# Patient Record
Sex: FEMALE | Race: OTHER | URBAN - METROPOLITAN AREA
[De-identification: names, ages, dates, MRNs, and addresses within clinical notes are randomized per-mention and may not be internally consistent; named-entity substitution may affect disease eponyms.]

---

## 2024-04-24 ENCOUNTER — OFFICE VISIT (OUTPATIENT)
Dept: FAMILY MEDICINE CLINIC | Facility: CLINIC | Age: 54
End: 2024-04-24
Payer: COMMERCIAL

## 2024-04-24 VITALS
HEART RATE: 70 BPM | DIASTOLIC BLOOD PRESSURE: 62 MMHG | WEIGHT: 137 LBS | TEMPERATURE: 97 F | BODY MASS INDEX: 24.27 KG/M2 | SYSTOLIC BLOOD PRESSURE: 102 MMHG | HEIGHT: 63 IN | RESPIRATION RATE: 16 BRPM

## 2024-04-24 DIAGNOSIS — Z91.018 HISTORY OF FOOD ALLERGY: Primary | ICD-10-CM

## 2024-04-24 DIAGNOSIS — Z00.00 HEALTHCARE MAINTENANCE: ICD-10-CM

## 2024-04-24 DIAGNOSIS — Z12.31 ENCOUNTER FOR SCREENING MAMMOGRAM FOR MALIGNANT NEOPLASM OF BREAST: ICD-10-CM

## 2024-04-24 DIAGNOSIS — Z12.11 SCREEN FOR COLON CANCER: ICD-10-CM

## 2024-04-24 PROBLEM — R03.0 ELEVATED BLOOD PRESSURE, SITUATIONAL: Status: ACTIVE | Noted: 2023-06-26

## 2024-04-24 PROCEDURE — 99386 PREV VISIT NEW AGE 40-64: CPT | Performed by: FAMILY MEDICINE

## 2024-04-24 RX ORDER — PHENOL 1.4 %
600 AEROSOL, SPRAY (ML) MUCOUS MEMBRANE DAILY
COMMUNITY

## 2024-04-24 RX ORDER — MAGNESIUM OXIDE 400 MG (241.3 MG MAGNESIUM) TABLET
325 TABLET
COMMUNITY

## 2024-04-24 RX ORDER — CETIRIZINE HYDROCHLORIDE 10 MG/1
10 TABLET ORAL DAILY
COMMUNITY

## 2024-04-25 NOTE — PROGRESS NOTES
[facility] Family Medicine Office Note  Chief Complaint:   Chief Complaint   Patient presents with    Establish Care    Blood Pressure       HPI:   This is a 54 year old female coming in for establishing care.  The patient states that she has multiple issues with allergies.  She states that she has had episodes with almonds as well as other foods that she has been unable to pinpoint where she has episodes of trouble swallowing.  She has had epiglottitis to the point of needing hospitalization.  She currently has an EpiPen as well as prednisone and allergy medications with her at all times.  States that she would like to be evaluated for this so that she knows what she needs to stay away from.  She states that she has had some episodes of elevated blood pressure in the past and had been on medications for this but at this point she has not had the need for it.  She denies any family history of breast cancer or colon cancer.  States that she has not had a mammogram or Pap smear in some time.  She does have a smoking history she quit about 15 years ago denies any illicit drug use does not use alcohol.  Denies any chest pain nausea vomiting diarrhea constipation.      No past medical history on file.  Past Surgical History:   Procedure Laterality Date    Path report - adenoids excision (d2v.1)      Us breast cyst aspiration 1 site right (cpt=76942/37418) Right      Social History:  Social History     Socioeconomic History    Marital status: Single   Tobacco Use    Smoking status: Former     Types: Cigarettes    Smokeless tobacco: Never   Vaping Use    Vaping status: Never Used   Substance and Sexual Activity    Alcohol use: Yes     Comment: socially    Drug use: Never    Sexual activity: Yes     Partners: Male   Other Topics Concern    Caffeine Concern No    Exercise No    Seat Belt Yes     Social Determinants of Health     Financial Resource Strain: Not on File (6/26/2023)    Received from [employer]  Resource Strain     Financial Resource Strain: 0   Food Insecurity: Not on File (6/26/2023)    Received from [employer]     Food Insecurity     Food: 0   Transportation Needs: Not on File (6/26/2023)    Received from [employer]     Transportation Needs     Transportation: 0   Physical Activity: Not on File (6/26/2023)    Received from [employer]     Physical Activity     Physical Activity: 0   Stress: Not on File (6/26/2023)    Received from [employer]     Stress     Stress: 0   Social Connections: Not on File (6/26/2023)    Received from [employer]     Social Connections     Social Connections and Isolation: 0    [facility]     Family History:  No family history on file.  Allergies:  Not on File  Current Meds:  Current Outpatient Medications   Medication Sig Dispense Refill    Multiple Vitamins-Minerals (CENTRUM SILVER 50+WOMEN OR) Take 1 tablet by mouth daily.      Calcium Carbonate 600 MG Oral Tab Take 1 tablet (600 mg total) by mouth daily.      Cyanocobalamin (VITAMIN B-12 CR OR) Take 1 capsule by mouth daily.      ferrous sulfate 325 (65 FE) MG Oral Tab EC Take 1 tablet (325 mg total) by mouth daily with breakfast.      cetirizine 10 MG Oral Tab Take 1 tablet (10 mg total) by mouth daily.        Counseling given: Not Answered       REVIEW OF SYSTEMS:   Consitutional: No fevers, chills, sweats  Eye: No recent visual problems  ENMT: No ear pain nasal congestion sore throat  Respiratory: No shortness of breath, cough  Cardiovascular: No chest pain palpitations syncope  Gastrointestinal: No nausea vomiting diarrhea  Genitourinary: No hematuria  Hema/Lymph no bruising tendency, swollen lymph glands  Endocrine: Negative for excessive thirst excessive hunger  Musculoskeletal: No back pain neck pain joint pain muscle pain decreased range of motion  Integumentary: No rash, pruritus, abrasions  Neurologic: Alert and oriented x4  Psychiatric: No anxiety, depression    Medical, surgical, family, and social histories  were reviewed      EXAM:   VITALS:   Vitals:    04/24/24 1624   BP: 102/62   Pulse: 70   Resp: 16   Temp: 97.2 °F (36.2 °C)      GENERAL: well developed, well nourished, in no apparent distress  SKIN: no rashes, no suspicious lesions: Cool and Dry  HEENT: atraumatic, normocephalic, ears and throat are clear    Ears: TM's clear and visible bilaterally, no excess cerumen or erythema.   EYES: Pupils equal round and reactive.  Extraocular motions intact no scleral icterus no injection or drainage  THROAT without erythema tonsillar hypertrophy or exudate.  Uvula midline airway patent  NECK: Given midline.  No JVD or lymphadenopathy supple nontender no meningeal signs   LUNGS: clear to auscultation sounds equal bilaterally no wheezes rales or rhonchi  CARDIO: Regular rate and rhythm without murmurs gallops or rubs  GI: Soft nontender nondistended no hepatomegaly palpable masses.  No guarding.   without deformity or crepitus no flank tenderness  EXTREMITIES: no cyanosis, clubbing or edema no joint tenderness effusion or edema noted.  No calf tenderness negative Homans' sign bilaterally  NEURO: Awake and alert.  Cranial nerves II through XII intact motor and sensory grossly within normal limits.  5 out of 5 muscle strength in all muscle groups.  Normal speech.  PSYCHIATRIC: Awake, alert and oriented x3, cooperative appropriate mood and affect.  Judgment intact       ASSESSMENT AND PLAN:   1. Healthcare maintenance  - Comp Metabolic Panel (14); Future  - Lipid Panel; Future  - Triiodothyronine (T3) Total; Future  - TSH and Free T4; Future  - CBC With Differential With Platelet; Future  I did discuss with the patient we would need to update labs we need a CBC CMP free T4 free T3 TSH.  Did also discuss we will go ahead and refer her to an allergist.  I did discuss that hopefully they will be able to discern what the allergies that she is having.  I did discuss with her to continue with the antihistamines as well as  to carry the EpiPen for any incidents.  Will also be referring her to gastroenterology for screening colonoscopy as well as placing a referral for mammogram.  Will be holding off on her other immunizations until she follows up with an allergist.  Will also be likely referring her to OB/GYN for her Pap smear.  2. History of food allergy  - Allergy Referral - In Network    3. Encounter for screening mammogram for malignant neoplasm of breast  - [facility] LALITO 2D+3D SCREENING BILAT (CPT=[id number]); Future    4. Screen for colon cancer  - Gastro Referral - In Network       Meds & Refills for this Visit:  Requested Prescriptions      No prescriptions requested or ordered in this encounter       Health Maintenance:  Health Maintenance Due   Topic Date Due    Colorectal Cancer Screening  Never done    Annual Physical  Never done    Mammogram  Never done    DTaP,Tdap,and Td Vaccines (1 - Tdap) Never done    Pap Smear  Never done    Zoster Vaccines (1 of 2) Never done    COVID-19 Vaccine (2 - 2023-24 season) 09/01/2023    Annual Depression Screening  Never done       Patient/Caregiver Education: Patient/Caregiver Education: There are no barriers to learning. Medical education done.   Outcome: Patient verbalizes understanding. Patient is notified to call with any questions, complications, allergies, or worsening or changing symptoms.  Patient is to call with any side effects or complications from the treatments as a result of today.     Problem List:  Patient Active Problem List   Diagnosis    Elevated blood pressure, situational         No follow-ups on file.     Sameer Joiner MD    Please note that portions of this note may have been completed with a voice recognition program. Efforts were made to edit the dictations but occasionally words are mis-transcribed.

## 2024-05-11 ENCOUNTER — LAB ENCOUNTER (OUTPATIENT)
Dept: LAB | Age: 54
End: 2024-05-11
Attending: FAMILY MEDICINE
Payer: COMMERCIAL

## 2024-05-11 DIAGNOSIS — Z00.00 HEALTHCARE MAINTENANCE: ICD-10-CM

## 2024-05-11 LAB
ALBUMIN SERPL-MCNC: 4.3 G/DL (ref 3.2–4.8)
ALBUMIN/GLOB SERPL: 1.4 {RATIO} (ref 1–2)
ALP LIVER SERPL-CCNC: 68 U/L
ALT SERPL-CCNC: 38 U/L
ANION GAP SERPL CALC-SCNC: 3 MMOL/L (ref 0–18)
AST SERPL-CCNC: 29 U/L (ref ?–34)
BASOPHILS # BLD AUTO: 0.02 X10(3) UL (ref 0–0.2)
BASOPHILS NFR BLD AUTO: 0.5 %
BILIRUB SERPL-MCNC: 0.3 MG/DL (ref 0.3–1.2)
BUN BLD-MCNC: 19 MG/DL (ref 9–23)
BUN/CREAT SERPL: 26.4 (ref 10–20)
CALCIUM BLD-MCNC: 8.6 MG/DL (ref 8.7–10.4)
CHLORIDE SERPL-SCNC: 109 MMOL/L (ref 98–112)
CHOLEST SERPL-MCNC: 182 MG/DL (ref ?–200)
CO2 SERPL-SCNC: 28 MMOL/L (ref 21–32)
CREAT BLD-MCNC: 0.72 MG/DL
DEPRECATED RDW RBC AUTO: 47.9 FL (ref 35.1–46.3)
EGFRCR SERPLBLD CKD-EPI 2021: 99 ML/MIN/1.73M2 (ref 60–?)
EOSINOPHIL # BLD AUTO: 0.07 X10(3) UL (ref 0–0.7)
EOSINOPHIL NFR BLD AUTO: 1.8 %
ERYTHROCYTE [DISTWIDTH] IN BLOOD BY AUTOMATED COUNT: 14.3 % (ref 11–15)
FASTING PATIENT LIPID ANSWER: YES
FASTING STATUS PATIENT QL REPORTED: YES
GLOBULIN PLAS-MCNC: 3 G/DL (ref 2–3.5)
GLUCOSE BLD-MCNC: 87 MG/DL (ref 70–99)
HCT VFR BLD AUTO: 37.4 %
HDLC SERPL-MCNC: 83 MG/DL (ref 40–59)
HGB BLD-MCNC: 12.8 G/DL
IMM GRANULOCYTES # BLD AUTO: 0.01 X10(3) UL (ref 0–1)
IMM GRANULOCYTES NFR BLD: 0.3 %
LDLC SERPL CALC-MCNC: 90 MG/DL (ref ?–100)
LYMPHOCYTES # BLD AUTO: 1.24 X10(3) UL (ref 1–4)
LYMPHOCYTES NFR BLD AUTO: 31 %
MCH RBC QN AUTO: 31.1 PG (ref 26–34)
MCHC RBC AUTO-ENTMCNC: 34.2 G/DL (ref 31–37)
MCV RBC AUTO: 90.8 FL
MONOCYTES # BLD AUTO: 0.33 X10(3) UL (ref 0.1–1)
MONOCYTES NFR BLD AUTO: 8.3 %
NEUTROPHILS # BLD AUTO: 2.33 X10 (3) UL (ref 1.5–7.7)
NEUTROPHILS # BLD AUTO: 2.33 X10(3) UL (ref 1.5–7.7)
NEUTROPHILS NFR BLD AUTO: 58.1 %
NONHDLC SERPL-MCNC: 99 MG/DL (ref ?–130)
OSMOLALITY SERPL CALC.SUM OF ELEC: 292 MOSM/KG (ref 275–295)
PLATELET # BLD AUTO: 248 10(3)UL (ref 150–450)
POTASSIUM SERPL-SCNC: 4 MMOL/L (ref 3.5–5.1)
PROT SERPL-MCNC: 7.3 G/DL (ref 5.7–8.2)
RBC # BLD AUTO: 4.12 X10(6)UL
SODIUM SERPL-SCNC: 140 MMOL/L (ref 136–145)
T3 SERPL-MCNC: 0.88 NG/ML (ref 0.6–1.81)
T4 FREE SERPL-MCNC: 1 NG/DL (ref 0.8–1.7)
TRIGL SERPL-MCNC: 45 MG/DL (ref 30–149)
TSI SER-ACNC: 1.38 MIU/ML (ref 0.55–4.78)
VLDLC SERPL CALC-MCNC: 7 MG/DL (ref 0–30)
WBC # BLD AUTO: 4 X10(3) UL (ref 4–11)

## 2024-05-11 PROCEDURE — 84480 ASSAY TRIIODOTHYRONINE (T3): CPT

## 2024-05-11 PROCEDURE — 84439 ASSAY OF FREE THYROXINE: CPT

## 2024-05-11 PROCEDURE — 80061 LIPID PANEL: CPT

## 2024-05-11 PROCEDURE — 84443 ASSAY THYROID STIM HORMONE: CPT

## 2024-05-11 PROCEDURE — 85025 COMPLETE CBC W/AUTO DIFF WBC: CPT

## 2024-05-11 PROCEDURE — 36415 COLL VENOUS BLD VENIPUNCTURE: CPT

## 2024-05-11 PROCEDURE — 80053 COMPREHEN METABOLIC PANEL: CPT

## 2024-05-22 ENCOUNTER — TELEPHONE (OUTPATIENT)
Dept: FAMILY MEDICINE CLINIC | Facility: CLINIC | Age: 54
End: 2024-05-22

## 2024-05-22 DIAGNOSIS — Z91.018 HISTORY OF FOOD ALLERGY: Primary | ICD-10-CM

## 2024-05-22 NOTE — TELEPHONE ENCOUNTER
Patient states Dr. Stevenson does not accept insurance. Patient did find a provider who is accepting insurance and would like a referral before her scheduled appointment 6/1.   Pt requesting referral for the below specialist:    Provider/Specialist:Dr. Degroot  Address:[address] [place] [address]#[phone]  Fax#    Has pt seen PCP for this condition? (Y/N):  no  Insurance:    [employer]

## 2024-05-25 ENCOUNTER — HOSPITAL ENCOUNTER (OUTPATIENT)
Dept: MAMMOGRAPHY | Age: 54
Discharge: HOME | End: 2024-05-25
Attending: FAMILY MEDICINE

## 2024-05-25 DIAGNOSIS — Z12.31 ENCOUNTER FOR SCREENING MAMMOGRAM FOR MALIGNANT NEOPLASM OF BREAST: ICD-10-CM

## 2024-05-25 PROCEDURE — 77067 SCR MAMMO BI INCL CAD: CPT | Performed by: FAMILY MEDICINE

## 2024-05-25 PROCEDURE — 77063 BREAST TOMOSYNTHESIS BI: CPT | Performed by: FAMILY MEDICINE

## 2024-06-25 ENCOUNTER — TELEPHONE (OUTPATIENT)
Dept: ALLERGY | Facility: CLINIC | Age: 54
End: 2024-06-25

## 2024-06-25 NOTE — TELEPHONE ENCOUNTER
Patient with a consult appointment on July 24, 2024 at 230 pm.   Dr. Degroot will be out of the office that day.            ID # [id number]   Patient is agreeable to consult appointment at 0900 on July 2, 2024.   Pt will discontinue allergy medication five days prior to appt.   Address was given. While the  was in the middle of translating the address patient had asked him to hold.   After a few minutes on hold, the call disconnected.   Patient had requested to be notified of address electronically. RN sent a [employer] message.

## 2024-07-01 NOTE — PROGRESS NOTES
Gabby Adam is a 54 year old female.    HPI:     Chief Complaint   Patient presents with    Food Allergy     Pt concerned about possible allergy to nuts,dried fruit and coconut and citrus--reports having rashes and throat swelling.     Asthma     Pt has hx of asthma since she was a child.      Patient is a 54-year-old female who presents for allergy consultation upon referral of her PCP, Dr. Joiner with a chief complaint of allergies  Prior notes visit with PCP from May 22, 2024 reviewed and appreciated including a history of food allergies concerning with for almond allergy.  Reported history of epiglottitis per PCPs notes    Patient was 20 minutes late for her 30-minute appointment.  Reviewed with patient that would be an abbreviated appointment     Medication list includes Zyrtec    Vaccines reviewed.  COVID-vaccine x 1 dose in 2021.  No flu vaccine on record    Today patient reports      Allergies? Foods?   Duration:    many  years     Timing: intermittent   Symptoms:  hives , rashes , globus   Prior ed or epipen:   prior ed    Has epipen   Severity: moderate   Triggers: foods?   Tried:  zyrtec  , no issues since starting zyrtec , last zyrtec was 7 days ago . No current rash   Pets or smokers: denies  Nonsmoker     Hx of asthma, ad, or food allergy:   Hx of asthma  Denies current symptoms   Alb prn     + nut allergy per pt   Concern for citrus allergy   Coconut pineapple   Ok with  apples and fish   Seafood fish           HISTORY:  History reviewed. No pertinent past medical history.   Past Surgical History:   Procedure Laterality Date    Clarice localization wire 1 site right (cpt=19281)      age 38    Path report - adenoids excision (d2v.1)      Us breast cyst aspiration 1 site right (cpt=76942/96954) Right       History reviewed. No pertinent family history.   Social History:   Social History     Socioeconomic History    Marital status: Single   Tobacco Use    Smoking status: Former     Types: Cigarettes     Smokeless tobacco: Never   Vaping Use    Vaping status: Never Used   Substance and Sexual Activity    Alcohol use: Yes     Comment: socially    Drug use: Never    Sexual activity: Yes     Partners: Male   Other Topics Concern    Caffeine Concern No    Exercise No    Seat Belt Yes     Social Determinants of Health     Financial Resource Strain: Not on File (6/26/2023)    Received from [employer]     Financial Resource Strain     Financial Resource Strain: 0   Food Insecurity: Not on File (6/26/2023)    Received from [employer]     Food Insecurity     Food: 0   Transportation Needs: Not on File (6/26/2023)    Received from [employer]     Transportation Needs     Transportation: 0   Physical Activity: Not on File (6/26/2023)    Received from [employer]     Physical Activity     Physical Activity: 0   Stress: Not on File (6/26/2023)    Received from [employer]     Stress     Stress: 0   Social Connections: Not on File (6/26/2023)    Received from [employer]     Social Connections     Social Connections and Isolation: 0    Received from [employer]    [facility]        Medications (Active prior to today's visit):  Current Outpatient Medications   Medication Sig Dispense Refill    levocetirizine 5 MG Oral Tab Take 1 tablet (5 mg total) by mouth every evening. 90 tablet 1    Multiple Vitamins-Minerals (CENTRUM SILVER 50+WOMEN OR) Take 1 tablet by mouth daily.      Calcium Carbonate 600 MG Oral Tab Take 1 tablet (600 mg total) by mouth daily.      Cyanocobalamin (VITAMIN B-12 CR OR) Take 1 capsule by mouth daily.      ferrous sulfate 325 (65 FE) MG Oral Tab EC Take 1 tablet (325 mg total) by mouth daily with breakfast.         Allergies:  Not on File      ROS:     Allergic/Immuno:  See HPI  Cardiovascular:  Negative for irregular heartbeat/palpitations, chest pain, edema  Constitutional:  Negative night sweats,weight loss, irritability and lethargy  Endocrine:  Negative for cold intolerance, polydipsia and polyphagia  ENMT:  Negative for ear drainage,  hearing loss and nasal drainage  Eyes:  Negative for eye discharge and vision loss  Gastrointestinal:  Negative for abdominal pain, diarrhea and vomiting  Genitourinary:  Negative for dysuria and hematuria  Hema/Lymph:  Negative for easy bleeding and easy bruising  Integumentary:  Negative for pruritus and rash  Musculoskeletal:  Negative for joint symptoms  Neurological:  Negative for dizziness, seizures  Psychiatric:  Negative for inappropriate interaction and psychiatric symptoms  Respiratory:  Negative for cough, dyspnea and wheezing      PHYSICAL EXAM:   Constitutional: responsive, no acute distress noted  Head/Face: NC/Atraumatic  Eyes/Vision: conjunctiva and lids are normal extraocular motion is intact   Ears/Audiometry: tympanic membranes are normal bilaterally hearing is grossly intact  Nose/Mouth/Throat: nose and throat are clear mucous membranes are moist   Neck/Thyroid: neck is supple without adenopathy  Lymphatic: no abnormal cervical, supraclavicular or axillary adenopathy is noted  Respiratory: normal to inspection lungs are clear to auscultation bilaterally normal respiratory effort   Cardiovascular: regular rate and rhythm no murmurs, gallups, or rubs  Abdomen: soft non-tender non-distended  Skin/Hair: no unusual rashes present  Extremities: no edema, cyanosis, or clubbing  Neurological:Oriented to time, place, person & situation       ASSESSMENT/PLAN:   Assessment   Encounter Diagnoses   Name Primary?    Food allergy Yes    Flu vaccine need     Need for COVID-19 vaccine     Angioedema, initial encounter    Skin testing to like foods including peanut tree nut panel fish panel shellfish panel coconut pineapple orange lemon was positive to almond and negative to the remaining foods    #1 Food allergies  See above clinical history  See above skin testing to foods by history that cause symptoms  Recommend to avoid those foods that are positive on skin testing  EpiPen and Benadryl as needed based on  symptom severity in  event of allergic reaction    2.  Flu vaccine recommended in the fall    3.  COVID vaccines reviewed.  Recommend booster.  Reviewed I do not have the booster my office.  Please check with local pharmacy      #4 suspected angioedema.  Check C1 esterase inhibitor panel patient reports previous episode of epiglottitis.  Reviewed infectious etiology can also cause epiglottitis.  Continue with Zyrtec 10 mg once a day or Xyzal 5 mg once a day.  If increasing symptoms Zyrtec or Xyzal may be taken up to 2-4 times per day  EpiPen up-to-date    #5 asthma  No current symptoms.  Denies symptoms 1 to 2 days/week  Albuterol 2 puffs every 4-6 hours if having active coughing wheezing or shortness of breath       Orders This Visit:  Orders Placed This Encounter   Procedures    Complement C4 and C1 Esterase Serum plus C1 Esterase Inhibitor Function       Meds This Visit:  Requested Prescriptions     Signed Prescriptions Disp Refills    levocetirizine 5 MG Oral Tab 90 tablet 1     Sig: Take 1 tablet (5 mg total) by mouth every evening.       Imaging & Referrals:  None     7/1/2024  Edwin Degroot MD      If medication samples were provided today, they were provided solely for patient education and training related to self administration of these medications.  Teaching, instruction and sample was provided to the patient by myself.  Teaching included  a review of potential adverse side effects as well as potential efficacy.  Patient's questions were answered in regards to medication administration and dosing and potential side effects. Teaching was provided via the teach back method

## 2024-07-02 ENCOUNTER — NURSE ONLY (OUTPATIENT)
Dept: ALLERGY | Facility: CLINIC | Age: 54
End: 2024-07-02

## 2024-07-02 ENCOUNTER — LAB ENCOUNTER (OUTPATIENT)
Dept: LAB | Age: 54
End: 2024-07-02
Attending: ALLERGY & IMMUNOLOGY
Payer: COMMERCIAL

## 2024-07-02 ENCOUNTER — TELEPHONE (OUTPATIENT)
Dept: ALLERGY | Facility: CLINIC | Age: 54
End: 2024-07-02

## 2024-07-02 ENCOUNTER — OFFICE VISIT (OUTPATIENT)
Dept: ALLERGY | Facility: CLINIC | Age: 54
End: 2024-07-02
Payer: COMMERCIAL

## 2024-07-02 VITALS
DIASTOLIC BLOOD PRESSURE: 80 MMHG | OXYGEN SATURATION: 97 % | HEART RATE: 64 BPM | SYSTOLIC BLOOD PRESSURE: 121 MMHG | RESPIRATION RATE: 18 BRPM

## 2024-07-02 DIAGNOSIS — Z91.018 FOOD ALLERGY: Primary | ICD-10-CM

## 2024-07-02 DIAGNOSIS — T78.3XXA ANGIOEDEMA, INITIAL ENCOUNTER: ICD-10-CM

## 2024-07-02 DIAGNOSIS — Z23 NEED FOR COVID-19 VACCINE: ICD-10-CM

## 2024-07-02 DIAGNOSIS — Z91.018 FOOD ALLERGY: ICD-10-CM

## 2024-07-02 DIAGNOSIS — Z23 FLU VACCINE NEED: ICD-10-CM

## 2024-07-02 PROCEDURE — 86003 ALLG SPEC IGE CRUDE XTRC EA: CPT

## 2024-07-02 PROCEDURE — 86161 COMPLEMENT/FUNCTION ACTIVITY: CPT

## 2024-07-02 PROCEDURE — 36415 COLL VENOUS BLD VENIPUNCTURE: CPT

## 2024-07-02 PROCEDURE — 82785 ASSAY OF IGE: CPT

## 2024-07-02 PROCEDURE — 86160 COMPLEMENT ANTIGEN: CPT

## 2024-07-02 PROCEDURE — 99244 OFF/OP CNSLTJ NEW/EST MOD 40: CPT | Performed by: ALLERGY & IMMUNOLOGY

## 2024-07-02 PROCEDURE — 95004 PERQ TESTS W/ALRGNC XTRCS: CPT | Performed by: ALLERGY & IMMUNOLOGY

## 2024-07-02 RX ORDER — LEVOCETIRIZINE DIHYDROCHLORIDE 5 MG/1
5 TABLET, FILM COATED ORAL EVERY EVENING
Qty: 90 TABLET | Refills: 1 | Status: SHIPPED | OUTPATIENT
Start: 2024-07-02

## 2024-07-02 NOTE — TELEPHONE ENCOUNTER
RN called to patient using the  Jocelyne ID # [id number]      RN informed patient that office had received a fax from [facility] pharmacy that xyzal was not covered by insurance   Advised that patient may purchase over the counter  Usually [facility] or [facility] is the cheapest  Patient verbalized understanding and denied further questions at this time

## 2024-07-02 NOTE — PATIENT INSTRUCTIONS
#1 Food allergies  See above clinical history  See above skin testing to foods by history that cause symptoms  Recommend to avoid those foods that are positive on skin testing  EpiPen and Benadryl as needed based on symptom severity in  event of allergic reaction    2.  Flu vaccine recommended in the fall    3.  COVID vaccines reviewed.  Recommend booster.  Reviewed I do not have the booster my office.  Please check with local pharmacy      #4 suspected angioedema.  Check C1 esterase inhibitor panel patient reports previous episode of epiglottitis.  Reviewed infectious etiology can also cause epiglottitis.  Continue with Zyrtec 10 mg once a day or Xyzal 5 mg once a day.  If increasing symptoms Zyrtec or Xyzal may be taken up to 2-4 times per day  EpiPen up-to-date    #5 asthma  No current symptoms.  Denies symptoms 1 to 2 days/week  Albuterol 2 puffs every 4-6 hours if having active coughing wheezing or shortness of breath       Orders This Visit:  Orders Placed This Encounter   Procedures    Complement C4 and C1 Esterase Serum plus C1 Esterase Inhibitor Function       Meds This Visit:  Requested Prescriptions     Signed Prescriptions Disp Refills    levocetirizine 5 MG Oral Tab 90 tablet 1     Sig: Take 1 tablet (5 mg total) by mouth every evening.

## 2024-07-03 LAB
COCONUT IGE QN: 0.31 KUA/L (ref ?–0.1)
ORANGE IGE QN: <0.1 KUA/L (ref ?–0.1)

## 2024-07-05 LAB
ALLERGEN BRAZIL NUT: <0.1 KUA/L (ref ?–0.1)
ALMOND IGE QN: <0.1 KUA/L (ref ?–0.1)
CASHEW NUT IGE QN: <0.1 KUA/L (ref ?–0.1)
CLAM IGE QN: <0.1 KUA/L (ref ?–0.1)
CODFISH IGE QN: <0.1 KUA/L (ref ?–0.1)
CORN IGE QN: 0.1 KUA/L (ref ?–0.1)
COW MILK IGE QN: <0.1 KUA/L (ref ?–0.1)
EGG WHITE IGE QN: <0.1 KUA/L (ref ?–0.1)
HAZELNUT IGE QN: 0.14 KUA/L (ref ?–0.1)
IGE SERPL-ACNC: 97.6 KU/L (ref 2–214)
PEANUT IGE QN: 0.12 KUA/L (ref ?–0.1)
SALMON IGE QN: <0.1 KUA/L (ref ?–0.1)
SCALLOP IGE QN: <0.1 KUA/L (ref ?–0.1)
SESAME SEED IGE QN: 0.18 KUA/L (ref ?–0.1)
SHRIMP IGE QN: <0.1 KUA/L (ref ?–0.1)
SOYBEAN IGE QN: <0.1 KUA/L (ref ?–0.1)
WALNUT IGE QN: <0.1 KUA/L (ref ?–0.1)
WHEAT IGE QN: <0.1 KUA/L (ref ?–0.1)

## 2024-07-07 LAB
F208-IGE LEMON: 0.32 KU/L
F208-IGE LEMON: 0.32 KU/L
F210-IGE PINEAPPLE: <0.1 KU/L

## 2024-07-10 ENCOUNTER — TELEPHONE (OUTPATIENT)
Dept: ALLERGY | Facility: CLINIC | Age: 54
End: 2024-07-10

## 2024-07-10 LAB
C1 EST INHIB FUNC: 91 %MEAN NORMAL
C1 ESTERASE INHIB: 26 MG/DL
C4 COMPLEMENT: 28 MG/DL

## 2024-07-10 NOTE — TELEPHONE ENCOUNTER
----- Message from Edwin Degroot sent at 7/10/2024 10:36 AM CDT -----    Please contact patient with normal C1 esterase inhibitor panel and normal C4 level

## 2024-07-10 NOTE — TELEPHONE ENCOUNTER
----- Message from Edwin Degroot sent at 7/8/2024  6:59 AM CDT -----  Please contact patient with negative serum IgE testing to pineapple.  Patient did show IgE production eleven 0.32.  Recommend to avoid at this time.  May consider oral challenge to further evaluate given lower level of IgE production

## 2024-07-10 NOTE — TELEPHONE ENCOUNTER
----- Message from Edwin Degroot sent at 7/7/2024  6:34 AM CDT -----  Please contact patient with recent serum IgE profile to food allergens.  Patient did show IgE production to corn hazelnut peanut and sesame seed in very low levels.  May consider oral challenge to further evaluate given lower levels of IgE production if no reactions over the prior year if patient is currently tolerating these foods then may continue to do so

## 2024-07-10 NOTE — TELEPHONE ENCOUNTER
----- Message from Edwin Degroot sent at 7/5/2024  7:25 AM CDT -----  Please contact patient with serum IgE testing to coconut 0.31 and negative to orange.  Recommend to avoid coconut.  May consider oral challenge to further evaluate given lower levels of IgE production

## 2024-07-10 NOTE — TELEPHONE ENCOUNTER
Spoke with patient via language line  [place]oc[place], ID [id number]. Verified patient's name and . Informed patient of test results and recommendations per Dr. Degroot. Patient states she wishes to avoid foods that were positive on the labs. No further questions at this time.

## 2025-03-25 ENCOUNTER — OFFICE VISIT (OUTPATIENT)
Dept: FAMILY MEDICINE CLINIC | Facility: CLINIC | Age: 55
End: 2025-03-25
Payer: COMMERCIAL

## 2025-03-25 VITALS
HEIGHT: 63 IN | BODY MASS INDEX: 24.8 KG/M2 | SYSTOLIC BLOOD PRESSURE: 118 MMHG | OXYGEN SATURATION: 97 % | DIASTOLIC BLOOD PRESSURE: 84 MMHG | RESPIRATION RATE: 14 BRPM | TEMPERATURE: 98 F | WEIGHT: 140 LBS | HEART RATE: 75 BPM

## 2025-03-25 DIAGNOSIS — Z00.00 HEALTHCARE MAINTENANCE: ICD-10-CM

## 2025-03-25 DIAGNOSIS — Z12.11 SCREEN FOR COLON CANCER: ICD-10-CM

## 2025-03-25 DIAGNOSIS — Z23 ENCOUNTER FOR IMMUNIZATION: ICD-10-CM

## 2025-03-25 DIAGNOSIS — Z12.31 VISIT FOR SCREENING MAMMOGRAM: ICD-10-CM

## 2025-03-25 DIAGNOSIS — Z91.018 HISTORY OF FOOD ALLERGY: Primary | ICD-10-CM

## 2025-03-25 DIAGNOSIS — L98.9 SKIN LESION: ICD-10-CM

## 2025-03-25 PROCEDURE — 90715 TDAP VACCINE 7 YRS/> IM: CPT | Performed by: FAMILY MEDICINE

## 2025-03-25 PROCEDURE — 90471 IMMUNIZATION ADMIN: CPT | Performed by: FAMILY MEDICINE

## 2025-03-25 PROCEDURE — 90472 IMMUNIZATION ADMIN EACH ADD: CPT | Performed by: FAMILY MEDICINE

## 2025-03-25 PROCEDURE — 90750 HZV VACC RECOMBINANT IM: CPT | Performed by: FAMILY MEDICINE

## 2025-03-25 PROCEDURE — 99214 OFFICE O/P EST MOD 30 MIN: CPT | Performed by: FAMILY MEDICINE

## 2025-03-25 RX ORDER — EPINEPHRINE 0.3 MG/.3ML
0.3 INJECTION SUBCUTANEOUS AS NEEDED
Qty: 2 EACH | Refills: 0 | Status: SHIPPED | OUTPATIENT
Start: 2025-03-25 | End: 2026-03-25

## 2025-03-25 RX ORDER — PREDNISONE 20 MG/1
20 TABLET ORAL DAILY
COMMUNITY

## 2025-03-25 NOTE — PROGRESS NOTES
[facility] Family Medicine Office Note  Chief Complaint:   Chief Complaint   Patient presents with    Physical     Last CLARICE 24. Col Screen ordered 24 not done. No hx of PAP screen done  Pt's epiPen to  in Aug 2025 and would like a refill of the \"cheaper\"version  Pt would like to discuss weight issue       HPI:   This is a 55 year old female coming in for skin lesion of the left cheek patient states that she is concerned about this.  States that it has not changed in size or color.  She denies any other acute concerns today.      No past medical history on file.  Past Surgical History:   Procedure Laterality Date    Clarice localization wire 1 site right (cpt=19281)      age 38    Path report - adenoids excision (d2v.1)      Us breast cyst aspiration 1 site right (cpt=76942/58487) Right      Social History:  Social History     Socioeconomic History    Marital status: Single   Tobacco Use    Smoking status: Former     Types: Cigarettes    Smokeless tobacco: Never   Vaping Use    Vaping status: Never Used   Substance and Sexual Activity    Alcohol use: Yes     Comment: socially    Drug use: Never    Sexual activity: Yes     Partners: Male   Other Topics Concern    Caffeine Concern No    Exercise No    Seat Belt Yes     Social Drivers of Health     Food Insecurity: Not on File (2024)    Received from [facility]    Food Insecurity     Food: 0   Transportation Needs: Not on File (2023)    Received from [facility]    Transportation Needs     Transportation: 0   Stress: Not on File (2023)    Received from [facility]    Stress     Stress: 0    Received from [facility]     Family History:  No family history on file.  Allergies:  Allergies[1]  Current Meds:  Current Outpatient Medications   Medication Sig Dispense Refill    EPINEPHrine (EPIPEN IJ) Inject 0.3 mg as directed as needed.      predniSONE 20 MG Oral Tab Take 1 tablet (20 mg total) by mouth daily.      levocetirizine 5 MG Oral  Tab Take 1 tablet (5 mg total) by mouth every evening. 90 tablet 1    Multiple Vitamins-Minerals (CENTRUM SILVER 50+WOMEN OR) Take 1 tablet by mouth daily.      Calcium Carbonate 600 MG Oral Tab Take 1 tablet (600 mg total) by mouth daily.      Cyanocobalamin (VITAMIN B-12 CR OR) Take 1 capsule by mouth daily.      ferrous sulfate 325 (65 FE) MG Oral Tab EC Take 1 tablet (325 mg total) by mouth daily with breakfast.        Counseling given: Not Answered       REVIEW OF SYSTEMS:   Consitutional: No fevers, chills, sweats  Eye: No recent visual problems  ENMT: No ear pain nasal congestion sore throat  Respiratory: No shortness of breath, cough  Cardiovascular: No chest pain palpitations syncope  Gastrointestinal: No nausea vomiting diarrhea  Genitourinary: No hematuria  Hema/Lymph no bruising tendency, swollen lymph glands  Endocrine: Negative for excessive thirst excessive hunger  Musculoskeletal: No back pain neck pain joint pain muscle pain decreased range of motion  Integumentary positive skin lesion  Gallops.    Medical, surgical, family, and social histories were reviewed      EXAM:   VITALS:   Vitals:    03/25/25 1556   BP: 118/84   Pulse: 75   Resp: 14   Temp: 97.8 °F (36.6 °C)      GENERAL: well developed, well nourished, in no apparent distress  SKIN: no rashes, no suspicious lesions: Cool and Dry  HEENT: atraumatic, normocephalic, ears and throat are clear    Ears: TM's clear and visible bilaterally, no excess cerumen or erythema.   EYES: Pupils equal round and reactive.  Extraocular motions intact no scleral icterus no injection or drainage  THROAT without erythema tonsillar hypertrophy or exudate.  Uvula midline airway patent  NECK: Given midline.  No JVD or lymphadenopathy supple nontender no meningeal signs   LUNGS: clear to auscultation sounds equal bilaterally no wheezes rales or rhonchi  CARDIO: Regular rate and rhythm without murmurs gallops or rubs   Skin skin lesion of the left cheek some  irregular borders hyperpigmentation       ASSESSMENT AND PLAN:   1. Skin lesion  - Derm Referral - In Network  I did discuss with the patient at this time we will go ahead and refer her to dermatology I did discuss with her that this is likely more skin damage she was given information to have this completed.  2. Healthcare maintenance  - CBC With Differential With Platelet; Future  - Comp Metabolic Panel (14); Future  - Lipid Panel  - Triiodothyronine (T3) Total  - TSH and Free T4; Future  I did discuss with the patient she needs to update her blood work she need a CBC CMP free T4 free T3 TSH will also be ordering a mammogram for her as well as sending in a Cologuard.  She was asked to schedule an appointment with the nurse practitioner for her Pap smear.  She was asked to update a shingles vaccine today as well as tetanus as well as to follow-up in the next 2 months to complete the series.  3. Visit for screening mammogram  - 3D Mammogram Digital Screen, Bilateral (CPT=77067/41862); Future    4. Screen for colon cancer  - COLOGUARD COLON CANCER SCREENING (EXTERNAL)    5. Encounter for immunization  - ZOSTER VACC RECOMBINANT IM NJX  - TETANUS, DIPHTHERIA TOXOIDS AND ACELLULAR PERTUSIS VACCINE (TDAP), >7 YEARS, IM USE  - ZOSTER VACC RECOMBINANT IM NJX; Future       Meds & Refills for this Visit:  Requested Prescriptions      No prescriptions requested or ordered in this encounter       Health Maintenance:  Health Maintenance Due   Topic Date Due    Colorectal Cancer Screening  Never done    DTaP,Tdap,and Td Vaccines (1 - Tdap) Never done    Pap Smear  Never done    Pneumococcal Vaccine: 50+ Years (1 of 1 - PCV) Never done    Zoster Vaccines (1 of 2) Never done    COVID-19 Vaccine (2 - 2024-25 season) 09/01/2024    Influenza Vaccine (1) Never done    Annual Physical  04/24/2025    Mammogram  05/25/2025       Patient/Caregiver Education: Patient/Caregiver Education: There are no barriers to learning. Medical education  done.   Outcome: Patient verbalizes understanding. Patient is notified to call with any questions, complications, allergies, or worsening or changing symptoms.  Patient is to call with any side effects or complications from the treatments as a result of today.     Problem List:  Patient Active Problem List   Diagnosis    Elevated blood pressure, situational         No follow-ups on file.     Sameer Joiner MD    Please note that portions of this note may have been completed with a voice recognition program. Efforts were made to edit the dictations but occasionally words are mis-transcribed.       [1]   Allergies  Allergen Reactions    Fruit ANAPHYLAXIS    Penicillins ANAPHYLAXIS

## 2025-04-01 ENCOUNTER — LAB ENCOUNTER (OUTPATIENT)
Dept: LAB | Age: 55
End: 2025-04-01
Attending: FAMILY MEDICINE
Payer: COMMERCIAL

## 2025-04-01 ENCOUNTER — OFFICE VISIT (OUTPATIENT)
Dept: FAMILY MEDICINE CLINIC | Facility: CLINIC | Age: 55
End: 2025-04-01
Payer: COMMERCIAL

## 2025-04-01 VITALS
HEART RATE: 72 BPM | HEIGHT: 63 IN | RESPIRATION RATE: 16 BRPM | BODY MASS INDEX: 26.13 KG/M2 | WEIGHT: 147.5 LBS | TEMPERATURE: 98 F | DIASTOLIC BLOOD PRESSURE: 60 MMHG | SYSTOLIC BLOOD PRESSURE: 112 MMHG

## 2025-04-01 DIAGNOSIS — Z00.00 HEALTHCARE MAINTENANCE: ICD-10-CM

## 2025-04-01 DIAGNOSIS — Z01.419 WELL WOMAN EXAM WITH ROUTINE GYNECOLOGICAL EXAM: ICD-10-CM

## 2025-04-01 DIAGNOSIS — Z12.4 PAPANICOLAOU SMEAR FOR CERVICAL CANCER SCREENING: Primary | ICD-10-CM

## 2025-04-01 DIAGNOSIS — N95.2 ATROPHIC VAGINITIS: ICD-10-CM

## 2025-04-01 LAB
ALBUMIN SERPL-MCNC: 4.6 G/DL (ref 3.2–4.8)
ALBUMIN/GLOB SERPL: 1.6 {RATIO} (ref 1–2)
ALP LIVER SERPL-CCNC: 80 U/L
ALT SERPL-CCNC: 21 U/L
ANION GAP SERPL CALC-SCNC: 6 MMOL/L (ref 0–18)
AST SERPL-CCNC: 23 U/L (ref ?–34)
BASOPHILS # BLD AUTO: 0.02 X10(3) UL (ref 0–0.2)
BASOPHILS NFR BLD AUTO: 0.4 %
BILIRUB SERPL-MCNC: 0.2 MG/DL (ref 0.3–1.2)
BUN BLD-MCNC: 16 MG/DL (ref 9–23)
CALCIUM BLD-MCNC: 9.8 MG/DL (ref 8.7–10.6)
CHLORIDE SERPL-SCNC: 104 MMOL/L (ref 98–112)
CO2 SERPL-SCNC: 28 MMOL/L (ref 21–32)
CREAT BLD-MCNC: 0.74 MG/DL
EGFRCR SERPLBLD CKD-EPI 2021: 95 ML/MIN/1.73M2 (ref 60–?)
EOSINOPHIL # BLD AUTO: 0.11 X10(3) UL (ref 0–0.7)
EOSINOPHIL NFR BLD AUTO: 2.2 %
ERYTHROCYTE [DISTWIDTH] IN BLOOD BY AUTOMATED COUNT: 13.8 %
FASTING STATUS PATIENT QL REPORTED: NO
GLOBULIN PLAS-MCNC: 2.9 G/DL (ref 2–3.5)
GLUCOSE BLD-MCNC: 119 MG/DL (ref 70–99)
HCT VFR BLD AUTO: 37.1 %
HGB BLD-MCNC: 12.4 G/DL
IMM GRANULOCYTES # BLD AUTO: 0.01 X10(3) UL (ref 0–1)
IMM GRANULOCYTES NFR BLD: 0.2 %
LYMPHOCYTES # BLD AUTO: 1.34 X10(3) UL (ref 1–4)
LYMPHOCYTES NFR BLD AUTO: 26.4 %
MCH RBC QN AUTO: 30.3 PG (ref 26–34)
MCHC RBC AUTO-ENTMCNC: 33.4 G/DL (ref 31–37)
MCV RBC AUTO: 90.7 FL
MONOCYTES # BLD AUTO: 0.33 X10(3) UL (ref 0.1–1)
MONOCYTES NFR BLD AUTO: 6.5 %
NEUTROPHILS # BLD AUTO: 3.26 X10 (3) UL (ref 1.5–7.7)
NEUTROPHILS # BLD AUTO: 3.26 X10(3) UL (ref 1.5–7.7)
NEUTROPHILS NFR BLD AUTO: 64.3 %
OSMOLALITY SERPL CALC.SUM OF ELEC: 288 MOSM/KG (ref 275–295)
PLATELET # BLD AUTO: 268 10(3)UL (ref 150–450)
POTASSIUM SERPL-SCNC: 4 MMOL/L (ref 3.5–5.1)
PROT SERPL-MCNC: 7.5 G/DL (ref 5.7–8.2)
RBC # BLD AUTO: 4.09 X10(6)UL
SODIUM SERPL-SCNC: 138 MMOL/L (ref 136–145)
T4 FREE SERPL-MCNC: 1 NG/DL (ref 0.8–1.7)
TSI SER-ACNC: 3.43 UIU/ML (ref 0.55–4.78)
WBC # BLD AUTO: 5.1 X10(3) UL (ref 4–11)

## 2025-04-01 PROCEDURE — 84439 ASSAY OF FREE THYROXINE: CPT

## 2025-04-01 PROCEDURE — 84443 ASSAY THYROID STIM HORMONE: CPT

## 2025-04-01 PROCEDURE — 85025 COMPLETE CBC W/AUTO DIFF WBC: CPT

## 2025-04-01 PROCEDURE — 80053 COMPREHEN METABOLIC PANEL: CPT

## 2025-04-01 PROCEDURE — 36415 COLL VENOUS BLD VENIPUNCTURE: CPT

## 2025-04-01 PROCEDURE — 84480 ASSAY TRIIODOTHYRONINE (T3): CPT | Performed by: FAMILY MEDICINE

## 2025-04-01 PROCEDURE — 99213 OFFICE O/P EST LOW 20 MIN: CPT

## 2025-04-01 NOTE — PROGRESS NOTES
[facility] Family Medicine Office Note  Chief Complaint:   Chief Complaint   Patient presents with    Pap       HPI:   This is a 55 year old female coming in for a breast exam, gynecologic exam, and pap smear. I saw this patient at visit last week with Dr. Joiner. Denies any complaints.  Went through menopause after birth of first daughter at age 27. Denies vaginal complaints. Does not do monthly breast exams.      History reviewed. No pertinent past medical history.  Past Surgical History:   Procedure Laterality Date    Clarice localization wire 1 site right (cpt=19281)      age 38    Path report - adenoids excision (d2v.1)      Us breast cyst aspiration 1 site right (cpt=76942/82829) Right      Social History:  Social History     Socioeconomic History    Marital status: Single   Tobacco Use    Smoking status: Former     Types: Cigarettes    Smokeless tobacco: Never   Vaping Use    Vaping status: Never Used   Substance and Sexual Activity    Alcohol use: Yes     Comment: socially    Drug use: Never    Sexual activity: Yes     Partners: Male   Other Topics Concern    Caffeine Concern No    Exercise No    Seat Belt Yes     Social Drivers of Health     Food Insecurity: Not on File (9/26/2024)    Received from [facility]    Food Insecurity     Food: 0   Transportation Needs: Not on File (6/26/2023)    Received from [facility]    Transportation Needs     Transportation: 0   Stress: Not on File (6/26/2023)    Received from [facility]    Stress     Stress: 0    [facility]     Family History:  History reviewed. No pertinent family history.  Allergies:  Allergies[1]  Current Meds:  Current Outpatient Medications   Medication Sig Dispense Refill    predniSONE 20 MG Oral Tab Take 1 tablet (20 mg total) by mouth daily.      EPINEPHrine (EPIPEN 2-SERENA) 0.3 MG/0.3ML Injection Solution Auto-injector Inject 0.3 mL (1 each total) as directed as needed. 2 each 0    levocetirizine 5 MG Oral Tab Take 1 tablet (5  mg total) by mouth every evening. 90 tablet 1    Multiple Vitamins-Minerals (CENTRUM SILVER 50+WOMEN OR) Take 1 tablet by mouth daily.      Calcium Carbonate 600 MG Oral Tab Take 1 tablet (600 mg total) by mouth daily.      Cyanocobalamin (VITAMIN B-12 CR OR) Take 1 capsule by mouth daily.      ferrous sulfate 325 (65 FE) MG Oral Tab EC Take 1 tablet (325 mg total) by mouth daily with breakfast.        Counseling given: Not Answered       REVIEW OF SYSTEMS:   ROS:  CONSTITUTIONAL:  Denies any unusual weight gain/loss, fever, chills, weakness or fatigue.  HEENT:  Eyes:  Denies visual loss, blurred vision, double vision or yellow sclerae. Ears, Nose, Throat:  Denies hearing loss, sneezing, congestion, runny nose or sore throat.  SKIN:  No rash or itching.  CARDIOVASCULAR:  Denies chest pain, chest pressure or chest discomfort. No palpitations or edema.  Denies any dyspnea on exertion or at rest  : Denies any vaginal itching, burning, discharge, or pain.   BREAST: Denies any lumps, discoloration, or nipple discharge.     EXAM:   /60   Pulse 72   Temp 97.5 °F (36.4 °C) (Temporal)   Resp 16   Ht 5' 3\" (1.6 m)   Wt 147 lb 8 oz (66.9 kg)   BMI 26.13 kg/m²  Estimated body mass index is 26.13 kg/m² as calculated from the following:    Height as of this encounter: 5' 3\" (1.6 m).    Weight as of this encounter: 147 lb 8 oz (66.9 kg).   Vital signs reviewed.Appears stated age, well groomed.  Physical Exam:  GEN:  Patient is alert and oriented x3, no apparent distress  ABDOMEN:  Soft, nondistended, nontender, bowel sounds normal in all 4 quadrants, no hepatosplenomegaly  BREAST: no dominant or suspicious mass, no nipple discharge  :introitus is normal,scant discharge,cervix is erythematous,no adnexal masses or tenderness, PAP was done  EXTREMITIES:  Strength intact with 5/5 bilaterally upper and lower extremities, no edema noted  NEURO:  CN 2 - 12 grossly intact     ASSESSMENT AND PLAN:   1. Papanicolaou smear  for cervical cancer screening  2. Well woman exam with routine gynecological exam  3. Atrophic vaginitis  Pap performed. Medical assistant helped to schedule mammogram for patient. Discussed routine breast exams. Also discussed atrophic vaginitis with patient who does not want treatment at this time. If starts to become symptomatic, I am willing to discuss treatment options with her. Will follow-up with results.  - ThinPrep PAP with HPV Reflex Request B; Future          Meds & Refills for this Visit:  Requested Prescriptions      No prescriptions requested or ordered in this encounter       Health Maintenance:  Health Maintenance Due   Topic Date Due    Colorectal Cancer Screening  Never done    Pap Smear  Never done    Pneumococcal Vaccine: 50+ Years (1 of 1 - PCV) Never done    COVID-19 Vaccine (2 - 2024-25 season) 09/01/2024    Annual Physical  04/24/2025    Mammogram  05/25/2025       Patient/Caregiver Education: Patient/Caregiver Education: There are no barriers to learning. Medical education done.   Outcome: Patient verbalizes understanding. Patient is notified to call with any questions, complications, allergies, or worsening or changing symptoms.  Patient is to call with any side effects or complications from the treatments as a result of today.     Problem List:  Patient Active Problem List   Diagnosis    Elevated blood pressure, situational       Janice Kennedy, APRJUWAN    Please note that portions of this note may have been completed with a voice recognition program. Efforts were made to edit the dictations but occasionally words are mis-transcribed.         [1]   Allergies  Allergen Reactions    Fruit ANAPHYLAXIS    Penicillins ANAPHYLAXIS

## 2025-04-02 LAB — T3 SERPL-MCNC: 0.69 NG/ML (ref 0.6–1.81)

## 2025-04-02 PROCEDURE — 87624 HPV HI-RISK TYP POOLED RSLT: CPT

## 2025-04-02 PROCEDURE — 87625 HPV TYPES 16 & 18 ONLY: CPT

## 2025-04-02 PROCEDURE — 88175 CYTOPATH C/V AUTO FLUID REDO: CPT

## 2025-04-04 LAB — HPV E6+E7 MRNA CVX QL NAA+PROBE: POSITIVE

## 2025-04-07 ENCOUNTER — OFFICE VISIT (OUTPATIENT)
Dept: FAMILY MEDICINE CLINIC | Facility: CLINIC | Age: 55
End: 2025-04-07
Payer: COMMERCIAL

## 2025-04-07 ENCOUNTER — TELEPHONE (OUTPATIENT)
Dept: OBGYN CLINIC | Facility: CLINIC | Age: 55
End: 2025-04-07

## 2025-04-07 VITALS
WEIGHT: 144.63 LBS | HEART RATE: 72 BPM | BODY MASS INDEX: 25.62 KG/M2 | DIASTOLIC BLOOD PRESSURE: 68 MMHG | TEMPERATURE: 98 F | HEIGHT: 63 IN | SYSTOLIC BLOOD PRESSURE: 116 MMHG

## 2025-04-07 DIAGNOSIS — N95.2 ATROPHIC VAGINITIS: Primary | ICD-10-CM

## 2025-04-07 DIAGNOSIS — R87.616 SATISFACTORY CERVICAL SMEAR BUT LACKING TRANSFORMATION ZONE: ICD-10-CM

## 2025-04-07 PROCEDURE — 99213 OFFICE O/P EST LOW 20 MIN: CPT

## 2025-04-07 NOTE — PROGRESS NOTES
[facility] Family Medicine Office Note  Chief Complaint:   Chief Complaint   Patient presents with    Lab Results       HPI:   This is a 55 year old female coming in to discuss results of pap smear. Patient with positive HPV. Patient states has never had a positive HPV before. Patient states has not been sexually active for a while and is uncertain how she could have gotten HPV.      History reviewed. No pertinent past medical history.  Past Surgical History:   Procedure Laterality Date    Clarice localization wire 1 site right (cpt=19281)      age 38    Path report - adenoids excision (d2v.1)      Us breast cyst aspiration 1 site right (cpt=76942/14870) Right      Social History:  Social History     Socioeconomic History    Marital status: Single   Tobacco Use    Smoking status: Former     Types: Cigarettes    Smokeless tobacco: Never   Vaping Use    Vaping status: Never Used   Substance and Sexual Activity    Alcohol use: Yes     Comment: socially    Drug use: Never    Sexual activity: Yes     Partners: Male   Other Topics Concern    Caffeine Concern No    Exercise No    Seat Belt Yes     Social Drivers of Health     Food Insecurity: Not on File (9/26/2024)    Received from [facility]    Food Insecurity     Food: 0   Transportation Needs: Not on File (6/26/2023)    Received from [facility]    Transportation Needs     Transportation: 0   Stress: Not on File (6/26/2023)    Received from [employer]    Stress     Stress: 0    [facility]     Family History:  History reviewed. No pertinent family history.  Allergies:  Allergies[1]  Current Meds:  Current Outpatient Medications   Medication Sig Dispense Refill    predniSONE 20 MG Oral Tab Take 1 tablet (20 mg total) by mouth daily.      EPINEPHrine (EPIPEN 2-SERENA) 0.3 MG/0.3ML Injection Solution Auto-injector Inject 0.3 mL (1 each total) as directed as needed. 2 each 0    levocetirizine 5 MG Oral Tab Take 1 tablet (5 mg total) by mouth every evening.  90 tablet 1    Multiple Vitamins-Minerals (CENTRUM SILVER 50+WOMEN OR) Take 1 tablet by mouth daily.      Calcium Carbonate 600 MG Oral Tab Take 1 tablet (600 mg total) by mouth daily.      Cyanocobalamin (VITAMIN B-12 CR OR) Take 1 capsule by mouth daily.      ferrous sulfate 325 (65 FE) MG Oral Tab EC Take 1 tablet (325 mg total) by mouth daily with breakfast.        Counseling given: Not Answered       REVIEW OF SYSTEMS:   ROS:  CONSTITUTIONAL:  Denies any unusual weight gain/loss, fever, chills, weakness or fatigue.  SKIN:  No rash or itching.  CARDIOVASCULAR:  Denies chest pain, chest pressure or chest discomfort. No palpitations or edema.  Denies any dyspnea on exertion or at rest  RESPIRATORY:  Denies shortness of breath, cough  GASTROINTESTINAL:  Denies any abdominal pain, nausea, vomiting, constipation, diarrhea, or blood in stool.  : Denies vaginal discharge, irritation.       EXAM:   /68   Pulse 72   Temp 97.6 °F (36.4 °C) (Temporal)   Ht 5' 3\" (1.6 m)   Wt 144 lb 9.6 oz (65.6 kg)   BMI 25.61 kg/m²  Estimated body mass index is 25.61 kg/m² as calculated from the following:    Height as of this encounter: 5' 3\" (1.6 m).    Weight as of this encounter: 144 lb 9.6 oz (65.6 kg).   Vital signs reviewed.Appears stated age, well groomed.  Physical Exam:  GEN:  Patient is alert and oriented x3, no apparent distress  HEAD:  Normocephalic, atraumatic  HEENT:  Eyes: EOMI, PERRLA, no scleral icterus, conjunctivae clear bilaterally.   LUNGS: clear to auscultation bilaterally, no rales/rhonchi/wheezing  HEART:  Regular rate and rhythm, no murmurs, rubs or gallops  ABDOMEN:  Soft, nondistended, nontender, bowel sounds normal in all 4 quadrants, no hepatosplenomegaly  EXTREMITIES:  Strength intact with 5/5 bilaterally upper and lower extremities, no edema noted  NEURO:  CN 2 - 12 grossly intact     ASSESSMENT AND PLAN:   1. Atrophic vaginitis  Discussed results of pap smear and potential need for colposcopy  due to atropic vaginitis. Given referral to gynecology. CEDRIC Keene attempted to make appointment for patient as she does not speak english, but they state they will have to call her with appointment information. Advised patient that if she does not hear from them in a day or two to send us a message and we will try for an appointment again.   - OBG Referral - In Network    2. Satisfactory cervical smear but lacking transformation zone  See above  - OBG Referral - In Network      Meds & Refills for this Visit:  Requested Prescriptions      No prescriptions requested or ordered in this encounter       Health Maintenance:  Health Maintenance Due   Topic Date Due    Colorectal Cancer Screening  Never done    Pneumococcal Vaccine: 50+ Years (1 of 1 - PCV) Never done    COVID-19 Vaccine (2 - 2024-25 season) 09/01/2024    Annual Physical  04/24/2025    Mammogram  05/25/2025       Patient/Caregiver Education: Patient/Caregiver Education: There are no barriers to learning. Medical education done.   Outcome: Patient verbalizes understanding. Patient is notified to call with any questions, complications, allergies, or worsening or changing symptoms.  Patient is to call with any side effects or complications from the treatments as a result of today.     Problem List:  Patient Active Problem List   Diagnosis    Elevated blood pressure, situational       VIELKA Liu    Please note that portions of this note may have been completed with a voice recognition program. Efforts were made to edit the dictations but occasionally words are mis-transcribed.         [1]   Allergies  Allergen Reactions    Fruit ANAPHYLAXIS    Nuts ANAPHYLAXIS    Penicillins ANAPHYLAXIS

## 2025-04-07 NOTE — TELEPHONE ENCOUNTER
Pt asking to be notified via [employer] for appt date due to not being able to answer the phone. Pt states she can do any day after 3:30, preferably in [place].

## 2025-04-08 LAB
HPV16 DNA CVX QL PROBE+SIG AMP: NEGATIVE
HPV18 DNA CVX QL PROBE+SIG AMP: NEGATIVE

## 2025-04-08 NOTE — TELEPHONE ENCOUNTER
Returned call to explain reason for referral from Dr. Kennedy in Italian. Educated on Abnormal Pap with Positive HPV result. Explained the colpo, steps, procedure, that it is done in office, why and what they are looking for. Confirmed patient understanding. Educated on ability to take Ibuprofen 600mg. Prior to the appointment time as she may experience some light cramping post procedure. Patient asked if there would be a Italian speaker available the day of the appointment for clear communication. I assured her they will make a point of using the language line to ensure her full understanding and ability to communicate. Provided my name and phone number should she think of any questions in the interim.

## 2025-04-08 NOTE — TELEPHONE ENCOUNTER
Scheduled patient for needed procedure with help of . She asks for a nurse to return her call to go over the procedure and what to expect. Thank you  Future Appointments   Date Time Provider Department Center   5/28/2025  4:40 PM JENNIFER CHAMBERLAIN [facility]   5/30/2025  1:30 PM Sade Hazel MD [facility] [address]

## 2025-04-10 ENCOUNTER — PATIENT MESSAGE (OUTPATIENT)
Dept: FAMILY MEDICINE CLINIC | Facility: CLINIC | Age: 55
End: 2025-04-10

## 2025-05-28 ENCOUNTER — HOSPITAL ENCOUNTER (OUTPATIENT)
Dept: MAMMOGRAPHY | Age: 55
Discharge: HOME | End: 2025-05-28
Attending: FAMILY MEDICINE
Payer: COMMERCIAL

## 2025-05-28 DIAGNOSIS — Z12.31 VISIT FOR SCREENING MAMMOGRAM: ICD-10-CM

## 2025-05-28 PROCEDURE — 77067 SCR MAMMO BI INCL CAD: CPT | Performed by: FAMILY MEDICINE

## 2025-05-28 PROCEDURE — 77063 BREAST TOMOSYNTHESIS BI: CPT | Performed by: FAMILY MEDICINE

## 2025-05-30 ENCOUNTER — OFFICE VISIT (OUTPATIENT)
Dept: OBGYN CLINIC | Facility: CLINIC | Age: 55
End: 2025-05-30
Payer: COMMERCIAL

## 2025-05-30 VITALS
WEIGHT: 140 LBS | HEIGHT: 63 IN | SYSTOLIC BLOOD PRESSURE: 110 MMHG | HEART RATE: 73 BPM | BODY MASS INDEX: 24.8 KG/M2 | DIASTOLIC BLOOD PRESSURE: 80 MMHG

## 2025-05-30 DIAGNOSIS — R87.810 CERVICAL HIGH RISK HUMAN PAPILLOMAVIRUS (HPV) DNA TEST POSITIVE: Primary | ICD-10-CM

## 2025-05-30 PROCEDURE — 99204 OFFICE O/P NEW MOD 45 MIN: CPT | Performed by: OBSTETRICS & GYNECOLOGY

## 2025-05-30 NOTE — PROCEDURES
[facility]  Obstetrics and Gynecology  Colposcopy Procedure Note    PROCEDURE  Colposcopy  Colposcopic-Directed Biopsies  E[facility]***    DATE OF PROCEDURE  25    INDICATIONS  Gabby Adam is a 55 year old yo  who is presenting for colposcopy.     HISTORY  Age: 55 year old No LMP recorded. Patient is postmenopausal.  Indication: {SAPAPRESULTS:01169}    Cervical Screening and/or Treatment History:   2025 - NILM HPV+ (16/18/45 negative)    Received HPV vaccine?: {SAYES/NO:03348::\"YES\"}  Current contraception: {SACONTRACEPTION:33028}  History of other GYN infections: {SAPELVICINFXN:18712}  Tobacco use: {SATOBACCO:84630}  Allergic to latex: {SAYES/NO:19588::\"NO\"}  Allergic to Iodine or Betadine: {SAYES/NO:41549::\"NO\"}    LAB  A pregnancy test {SAPREGNANCYTEST:79531::\"was completed and results are negative.\"}    PRE-PROCEDURE  A discussion was held with patient including diagnosis, prognosis, and management. The nature and meaning of PAP smears were discussed. HPV infection in relation to PAP smear changes were discussed. Cervical dysplasia and its significance and natural history discussed in detail. We reviewed the steps of the colposcopy procedure along with side effects and complications, including risk of bleeding, infection, non-diagnostic colposcopy result, and need for further sampling and/or excisional procedures. All questions were answered and patient consented to procedure.     PROCEDURE NOTE    The vulva, vagina, and perianal area were examined with findings of {SAABNORMALITIES:40570::\"no gross abnormalities.'\"}  A speculum was placed and the cervix was painted with acetic acid.   The following findings were noted:  Cervix: {SAVISUALIZATION:09619::\"fully visualized. \"}  Squamocolumnar junction: {SAVISUALIZATION:94018}  Acetowhite changes: {SAYES/NO:51112::\"YES\"}     Lesion(s) present (acetowhite or other):  {SAYES/NO:39648::\"YES\"}       Lesion description      ***Insert Picture     {UWM  NUMBER OF LESIONS:906835}  Features:  {SAGYNLESIONFEATURES:59052}        Cervical biopsies obtained at *** o'clock.  Random biopsies done: {UWM GYN YES NUMBER/NO:128471} Other biopsies: {.:810369}    Impression:   {FINDINGS:965243}    Endocervical curettage performed: {YES DEFAULT/NO:026028::\"yes\"}   Hemostasis: {SAHEMOSTASIS:55444::\"Monsel's solution\"}    PLAN:  Results will be communicated with the patient over MyChart  Discussed the importance of appropriate follow-up: YES  Patient desires in-person 2 week post-procedure follow up: {YES DEFAULT NO:690257::\"Yes\"}      Sade Hazel MD   [facility] - OBGYN      Note to patient and family   The 21st Century Cures Act makes medical notes available to patients in the interest of transparency.  However, please be advised that this is a medical document.  It is intended as [id number]-lc-velg communication.  It is written and medical language may contain abbreviations or verbiage that are technical and unfamiliar.  It may appear blunt or direct.  Medical documents are intended to carry relevant information, facts as evident, and the clinical opinion of the practitioner.

## 2025-05-30 NOTE — PROGRESS NOTES
The following individual(s) verbally consented to be recorded using ambient AI listening technology and understand that they can each withdraw their consent to this listening technology at any point by asking the clinician to turn off or pause the recording:    Patient name: Gabby Adam      Subjective:   Gabby Adam is a 55 year old female who presents for No chief complaint on file.       History/Other:   History of Present Illness  Gabby Adam is a 55-year-old female who presents with a positive high-risk HPV test result.    She underwent a Pap smear which returned as NILM (negative for intraepithelial lesion or malignancy), but she tested positive for a high-risk HPV strain, though not for strains 16, 18, or 45. She has not had any sexual contact in over ten years, which raises questions about the HPV result. She has been receiving regular Pap smears annually, and this is the first time she has tested positive for HPV.    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 She inquires about the possibility of a mistake in the test results and expresses a desire for further testing to confirm her health status.    She has not been informed of any specific symptoms related to the HPV infection, and there is no mention of any current medications related to this condition. Her social history includes being a single mother, which impacts her health decisions and concerns.   No Further Nursing Notes to Review  Tobacco Reviewed  Allergies   Reviewed  Medications Reviewed  Problem List Reviewed  Medical History   Reviewed  Surgical History Reviewed  OB Status Reviewed  Family History   Reviewed  Social History Reviewed         Tobacco:  She smoked tobacco in the past but quit greater than 12 months ago.  Tobacco Use[1]     Current  Medications[2]      Review of Systems:  Review of Systems  A comprehensive 10 point review of systems was completed.  Pertinent positives and negatives noted in the the HPI.      Objective:   /80   Pulse 73   Ht 63\"   Wt 140 lb (63.5 kg)   BMI 24.80 kg/m²  Estimated body mass index is 24.8 kg/m² as calculated from the following:    Height as of this encounter: 63\".    Weight as of this encounter: 140 lb (63.5 kg).  Physical Exam    General: AAO.NAD.   CVS exam: normal peripheral perfusion  Chest: non-labored breathing, no tachypnea   Breast: deferred  Abdominal exam: soft, nontender, nondistended  Pelvic exam: deferred  Ext: non-tender, no edema   Results  LABS  HPV: Positive for high-risk HPV, negative for strains 16, 18, and 45    PATHOLOGY  Pap smear: NILM (Negative for Intraepithelial Lesion or Malignancy)      Assessment & Plan:   There are no diagnoses linked to this encounter.  Assessment & Plan  High-risk HPV infection  Positive for high-risk HPV infection, negative for strains 16, 18, and 45 associated with cervical cancer. Pap smear results were NILM, indicating low risk for cervical cancer. The virus may be dormant and could clear spontaneously within a year. She desires colposcopy for reassurance. Discussed that 80-90% of the population contracts HPV, which often clears naturally.  - Schedule colposcopy within two to four weeks for further evaluation given patient preference for colposcopy however colposcopy is not indicated in this situation   - Repeat Pap smear in one year if colposcopy is not performed.  - Discussed insurance coverage for colposcopy but not for Pap smear before one year.  - Educate on maintaining general health to support immune system in clearing HPV.    Future Appointments   Date Time Provider Department Center   7/3/2025  3:00 PM Sade Hazel MD [facility] [address]           Sade Hazel MD, 5/30/2025, 2:36 PM                  [1]   Social History  Tobacco Use    Smoking Status Former    Types: Cigarettes    Passive exposure: Past   Smokeless Tobacco Never   [2]   Current Outpatient Medications   Medication Sig Dispense Refill    predniSONE 20 MG Oral Tab Take 1 tablet (20 mg total) by mouth daily.      EPINEPHrine (EPIPEN 2-SERENA) 0.3 MG/0.3ML Injection Solution Auto-injector Inject 0.3 mL (1 each total) as directed as needed. 2 each 0    levocetirizine 5 MG Oral Tab Take 1 tablet (5 mg total) by mouth every evening. 90 tablet 1    Multiple Vitamins-Minerals (CENTRUM SILVER 50+WOMEN OR) Take 1 tablet by mouth daily.      Calcium Carbonate 600 MG Oral Tab Take 1 tablet (600 mg total) by mouth daily.      Cyanocobalamin (VITAMIN B-12 CR OR) Take 1 capsule by mouth daily.      ferrous sulfate 325 (65 FE) MG Oral Tab EC Take 1 tablet (325 mg total) by mouth daily with breakfast.

## 2025-07-03 ENCOUNTER — OFFICE VISIT (OUTPATIENT)
Dept: OBGYN CLINIC | Facility: CLINIC | Age: 55
End: 2025-07-03
Payer: COMMERCIAL

## 2025-07-03 VITALS
BODY MASS INDEX: 24.8 KG/M2 | WEIGHT: 140 LBS | DIASTOLIC BLOOD PRESSURE: 70 MMHG | HEIGHT: 63 IN | SYSTOLIC BLOOD PRESSURE: 114 MMHG | HEART RATE: 75 BPM

## 2025-07-03 DIAGNOSIS — R87.810 CERVICAL HIGH RISK HUMAN PAPILLOMAVIRUS (HPV) DNA TEST POSITIVE: Primary | ICD-10-CM

## 2025-07-03 PROCEDURE — 57454 BX/CURETT OF CERVIX W/SCOPE: CPT | Performed by: OBSTETRICS & GYNECOLOGY

## 2025-07-03 PROCEDURE — 88342 IMHCHEM/IMCYTCHM 1ST ANTB: CPT | Performed by: OBSTETRICS & GYNECOLOGY

## 2025-07-03 PROCEDURE — 88305 TISSUE EXAM BY PATHOLOGIST: CPT | Performed by: OBSTETRICS & GYNECOLOGY

## 2025-07-03 NOTE — PROCEDURES
[facility]  Obstetrics and Gynecology  Colposcopy Procedure Note    PROCEDURE  Colposcopy  Colposcopic-Directed Biopsies  E[facility]    DATE OF PROCEDURE  25    INDICATIONS  Gabby Adam is a 55 year old yo  who is presenting for colposcopy.     HISTORY  Age: 55 year old No LMP recorded. Patient is postmenopausal.  Indication: NILM    Cervical Screening and/or Treatment History:   -2025 - NILM HR HPV+ (16/18/45 neg)   Patient previously counseled on guidelines during  appointment and desires to proceed with colposcopy. Please see note detailing instruction from  appointment.     Received HPV vaccine?: NO  Current contraception: postmenopausal  History of other GYN infections: NONE KNOWN  Tobacco use: NEVER SMOKER   Allergic to latex: NO  Allergic to Iodine or Betadine: NO    LAB  A pregnancy test was completed and results are negative.    PRE-PROCEDURE  A discussion was held with patient including diagnosis, prognosis, and management. The nature and meaning of PAP smears were discussed. HPV infection in relation to PAP smear changes were discussed. Cervical dysplasia and its significance and natural history discussed in detail. We reviewed the steps of the colposcopy procedure along with side effects and complications, including risk of bleeding, infection, non-diagnostic colposcopy result, and need for further sampling and/or excisional procedures. All questions were answered and patient consented to procedure.     PROCEDURE NOTE    The vulva, vagina, and perianal area were examined with findings of no gross abnormalities.'  A speculum was placed and the cervix was painted with acetic acid.   The following findings were noted:  Cervix: fully visualized.   Squamocolumnar junction: fully visualized.   Acetowhite changes: YES     Lesion(s) present (acetowhite or other):  NO       Lesion description    Physical Exam  Genitourinary:           Comments: Transient area of acetowhite change, pale  epithelium with stenotic cervix consistent with postmenopausal stage.             Features:  Low grade features: thin/translucent and rapidly fading acetowhite changes        Cervical biopsies obtained at 7:00 and 9:00 for transient acetowhite change     Impression:   Low grade     Endocervical curettage performed: yes   Hemostasis: Monsel's solution    PLAN:  Results will be communicated with the patient over MyChart and patient will receive a phone call from the office after 4 pm.    Discussed the importance of appropriate follow-up: YES    Sade Hazel MD   [facility] - O[facility]YN      Note to patient and family   The 21st Century Cures Act makes medical notes available to patients in the interest of transparency.  However, please be advised that this is a medical document.  It is intended as [id number] communication.  It is written and medical language may contain abbreviations or verbiage that are technical and unfamiliar.  It may appear blunt or direct.  Medical documents are intended to carry relevant information, facts as evident, and the clinical opinion of the practitioner.

## 2025-07-03 NOTE — PATIENT INSTRUCTIONS
Instrucciones de cuidado tras la colposcopia  ?? Qué esperar  Es normal lo siguiente:    Manchado ligero o flujo de color marrón izzy unos ramon    Cólicos leves, similares a los periodos    Flujo de color oscuro si se aplicó solución o se tomó biopsia (fili aspecto de “poso de café”)    ?? Consejos de autocuidado    Descansa según lo necesites hoy. La mayoría puede reanudar las actividades normales en 24 horas.    Puedes migue acetaminofén (Tylenol) o ibuprofeno (Advil) para los cólicos o molestias, siguiendo las indicaciones.    Usa compresa o pañal para el flujo. Alcira tampones por al menos 48 horas o hasta que tu médico te lo indique.    ?? Qué evitar (izzy 7 días o según te indiquen)    No tener relaciones sexuales vaginales    No usar tampones    No duchas vaginales ni cremas vaginales    Alcira ejercicio intenso o levantar peso izzy el thuy del día    ?? Cuándo llamar a tu médico  Comunícate de inmediato si presentas:    Sangrado abundante (empapar más de brianna compresa por hora)    Dolor pélvico intenso    Fiebre sobre 100.4?°F (38?°C)    Flujo maloliente    ?? Seguimiento  Tu médico te comunicará los resultados de la biopsia, generalmente entre 1 y 2 semanas. Si no recibes noticias dentro de brigida plazo, no dudes en llamar a la oficina.    Si tienes cualquier pregunta o inquietud, no dudes en contactarnos al número de la oficina.          AHCC for recurrent HPV      AHCC (Active Hexose Correlated Compound) is a natural supplement made from a specific type of mushroom (usually Lentinula edodes, also known as Shiitake). It has gained attention for its potential immune-boosting and antiviral properties, making it a popular choice for people looking to manage recurrent infections like HPV (human papillomavirus).    Here’s a breakdown of how AHCC might be helpful for recurrent HPV:    What is AHCC?  AHCC is a proprietary extract derived from the mycelium of Shiitake mushrooms. It’s known for its  immune-modulating effects, primarily through its ability to enhance the activity of certain immune cells like natural killer (NK) cells, macrophages, and dendritic cells. These cells are crucial in recognizing and fighting viral infections, including HPV.    How AHCC Might Help with Recurrent HPV    1. Boosts the Immune System  - Immune Activation: AHCC is believed to enhance the immune system’s ability to detect and destroy viral cells. By boosting the activity of NK cells and other immune cells, it may help the body keep HPV in check, preventing it from becoming active or reducing the frequency of outbreaks.    2. Anti-Viral Properties  - Support for Viral Clearance: Research suggests that AHCC can support the body in clearing persistent infections. While it doesn’t directly \"cure\" HPV, its immune-enhancing properties could help the body suppress the virus over time.    3. Helps with HPV-Related Conditions  - Cervical Health: There’s some research indicating that AHCC can help reduce the incidence of cervical dysplasia (abnormal cell growth on the cervix), which is associated with persistent HPV infection and can lead to cervical cancer if left untreated. By supporting the immune system, AHCC might help your body clear the virus before it causes significant damage.    4. May Reduce the Risk of Recurrence  - Preventing Recurrent Warts: In cases where HPV causes genital warts, AHCC might help by enhancing immune surveillance and promoting the clearance of warts. Some studies have shown promising results in people with HPV-related warts who took AHCC supplements regularly.    Research on AHCC and HPV  While AHCC is not a cure for HPV, there have been promising studies:  - HPV and Cervical Dysplasia: One small study published in the journal Gynecologic Oncology in 2013 showed that women with HPV-induced cervical dysplasia who took AHCC had improvements in their condition. Many experienced a reduction in abnormal  cells, and some were able to clear their HPV infection.  - General Immune Support: Other studies have shown that AHCC can increase immune function, reduce inflammation, and promote better immune responses against infections.     Two promising research studies:     1. In one study of women with persistent HPV infections, 4 out of the 8 women who remained in the study (50%) cleared their persistent HPV infection between 3 to 6 months after starting a 3g daily dose of AHCC on an empty stomach.      2. In a second  study, women were given 1g of AHCC a day on an empty stomach and showed a similar response rate: 4 out of the 9 women (44%) confirmed clearance of high-risk HPV persistent infections after 7 months of supplementation.    While more large-scale clinical trials are needed to definitively prove its effectiveness for HPV, early research is encouraging.    How to Use AHCC for HPV  Dosage: There isn’t a universally agreed-upon dosage for AHCC, but typical daily doses range from 500 mg to 1,000 mg per day. Some people may take it in divided doses, but always follow the instructions on the product you're using or consult your healthcare provider for personalized guidance.    Consistency: AHCC should be taken regularly for a period of time (usually several weeks to months) to see the full benefits, especially when it comes to chronic infections like HPV.    Consult Your Doctor: If you're considering AHCC, especially if you have an HPV infection or are undergoing treatment for HPV-related issues (such as abnormal Pap smears or warts), consult your healthcare provider first. They can help monitor your progress and ensure AHCC doesn't interfere with any other medications or treatments you’re using.    Here is a list of genuine AHCC products: https://www.ahcc.net/id-tested/     Safety and Side Effects  Generally Safe: AHCC is generally considered safe for most people when taken as directed. However, as with any  supplement, it may cause side effects in some individuals, such as mild digestive upset, bloating, or diarrhea.    Interactions with Medications: AHCC could interact with certain medications, particularly immunosuppressive drugs, since it enhances immune function. If you’re on any medications (especially for autoimmune diseases or organ transplants), check with your doctor before using AHCC.    Pregnancy and Breastfeeding: There isn't enough research to determine whether AHCC is safe during pregnancy or breastfeeding, so it's best to avoid it unless specifically advised by your healthcare provider.    Conclusion  While AHCC is not a cure for HPV, it may offer valuable immune support that could help reduce the frequency of recurrent HPV infections, assist in clearing the virus, and support cervical health. If you’re dealing with persistent HPV or related conditions like genital warts or cervical dysplasia, AHCC might be a useful addition to your health regimen. However, always consult with your doctor to ensure it's appropriate for your specific situation.

## (undated) NOTE — LETTER
Gabbyashley Pennyo, :3/9/1970    CONSENTIMIENTO PARA EL PROCEDIMIENTO/SEDACIÓN    1.Autorizo que se realice a  Adam lo siguiente:Colposcopia con biopsia y legrado endocervical     2.Autorizo al Dr. Sade Hazel MD (y a quien esté designado fili asistente del médico), a llevar a cabo los procedimientos antes mencionados.    3.Si surgen situaciones imprevistas izzy dante procedimiento que requieran procedimientos, operaciones o medicamentos adicionales (incluyendo anestesia y transfusión de juan), también solicito y autorizo al médico que jennifer lo que él/jordan considere conveniente en mi mejor interés.    4.Doy mi consentimiento para migue y reproducir fotografías en el transcurso de dante procedimiento con fines profesionales.    5.Doy mi consentimiento para la administración de la sedación que se considere necesaria o conveniente por el médico responsable de dante servicio, con la excepción de      6.He sido informado por mi médico sobre la naturaleza y el propósito de dante   procedimiento, sedación, los posibles métodos alternativos de tratamiento, los riesgos y las posibles complicaciones.    7.Si tengo brianna orden de No resucitar (DNR), el médico y yo (o la persona autorizada a juan el consentimiento en mi nombre) hablaremos y acordaremos si la orden de No resucitar (DNR) permanecerá en vigor, o si se interrumpirá izzy la cirugía y el período de recuperación correspondiente. Si la orden de No resucitar (DNR) se interrumpe y debe restablecerse tras el período de la cirugía/recuperación, el médico determinará cuándo terminará el período de recuperación correspondiente para fines de restablecer la orden de No resucitar.     Firma del paciente:      Firma de la persona autorizada para juan el consentimiento por el paciente: ______________________________________________________________________       Testigo: _________________________ Fecha: _______________       Firma Médico:  ___________________________________________________________

## (undated) NOTE — LETTER
Date: 3/25/2025    Patient Name: Gabby Adam          To Whom it may concern:    This letter has been written at the patient's request. The above patient was seen at [facility] for treatment of a medical condition.    This patient should be excused from lifting over 15 pounds indefinitely .     Please make appropriate accommodations              Sincerely,    Sameer Joiner MD

## (undated) NOTE — LETTER
Gabby Adam, :3/9/1970    CONSENT FOR PROCEDURE/SEDATION    1. I authorize the performance upon Gabby Adam  the following: Colposcopy with biopsy and Endocervical curettage    2. I authorize Dr. Sade Hazel MD (and whomever is designated as the doctor’s assistant), to perform the above-mentioned procedures.    3. If any unforeseen conditions arise during this procedure calling for additional  procedures, operations, or medications (including anesthesia and blood transfusion), I further request and authorize the doctor to do whatever he/she deems advisable in my interest.    4. I consent to the taking and reproduction of any photographs in the course of this procedure for professional purposes.    5. I consent to the administration of such sedation as may be considered necessary or advisable by the physician responsible for this service, with the exception of ______________________________________________________    6. I have been informed by my doctor of the nature and purpose of this procedure sedation, possible alternative methods of treatment, risk involved and possible complications.    7. If I have a Do Not Resuscitate (DNR) order in place, the physician and I (or the individual authorized to consent on my behalf) will discuss and agree as to whether the Do Not Resuscitate (DNR) order will remain in effect or will be discontinued during the performance of the procedure and the applicable recovery period. If the Do Not Resuscitate (DNR) order is discontinued and is to be reinstated following the procedure/recovery period, the physician will determine when the applicable recovery period ends for purposes of reinstating the Do Not Resuscitate (DNR) order.    Signature of Patient:_______________________________________________    Signature of person authorized to consent for patient:  _______________________________________________________________    Relationship to patient:  ____________________________________________    Witness: _________________________________________ Date:___________     Physician Signature: _______________________________ Date:___________